# Patient Record
Sex: MALE | Race: BLACK OR AFRICAN AMERICAN
[De-identification: names, ages, dates, MRNs, and addresses within clinical notes are randomized per-mention and may not be internally consistent; named-entity substitution may affect disease eponyms.]

---

## 2021-06-15 ENCOUNTER — HOSPITAL ENCOUNTER (EMERGENCY)
Dept: HOSPITAL 97 - ER | Age: 21
Discharge: HOME | End: 2021-06-15
Payer: COMMERCIAL

## 2021-06-15 VITALS — TEMPERATURE: 98.3 F

## 2021-06-15 VITALS — SYSTOLIC BLOOD PRESSURE: 114 MMHG | DIASTOLIC BLOOD PRESSURE: 81 MMHG | OXYGEN SATURATION: 100 %
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
## 2021-06-15 DIAGNOSIS — S63.592A: ICD-10-CM

## 2021-06-15 DIAGNOSIS — S16.1XXA: Primary | ICD-10-CM

## 2021-06-15 DIAGNOSIS — S00.90XA: ICD-10-CM

## 2021-06-15 PROCEDURE — 70450 CT HEAD/BRAIN W/O DYE: CPT

## 2021-06-15 PROCEDURE — 72125 CT NECK SPINE W/O DYE: CPT

## 2021-06-15 PROCEDURE — 99283 EMERGENCY DEPT VISIT LOW MDM: CPT

## 2021-06-15 NOTE — XMS REPORT
Continuity of Care Document

                            Created on:Marleen 15, 2021



Patient:JUAN KHOURY

Sex:Male

:2000

External Reference #:[id number]





Demographics







                          Address                   [address]



                                                    [place] [address]

 

                          Home Phone                [phone]

 

                          Work Phone                [phone]

 

                          Mobile Phone              [phone]

 

                          Email Address             [email or web address]

 

                          Preferred Language        English

 

                          Marital Status            Unknown

 

                          Anglican Affiliation     Unknown

 

                          Race                      Unknown

 

                          Additional Race(s)        Unavailable



                                                    Black or 

 

                          Ethnic Group              Not  or 









Author







                          Organization              [facility]

t

 

                          Address                   [address]



                                                    [place] [address]

 

                          Phone                     [phone]









Support







                Name            Relationship    Address         Phone

 

                Chacha          Mother          Unavailable     [phone]









Care Team Providers







                    Name                Role                Phone

 

                    Lab,  Jd Pob I     Attending Clinician Unavailable









Problems

This patient has no known problems.



Allergies, Adverse Reactions, Alerts

This patient has no known allergies or adverse reactions.



Medications

This patient has no known medications.



Procedures

This patient has no known procedures.



Encounters







        Start   End     Encounter Admission Attending Care    Care    Encounter 

Source



        Date/Time Date/Time Type    Type    Clinicians Facility Department ID   

   

 

        2020 Laboratory         Lab, [facility]    [phone]

[id number] 



        12:56:43 13:16:43 Only            Fam Pob I [facility]  [phone]         



                                                [place] [id number]         



                                                Profmarlene [phone]         



                                                nal     044             



                                                Office                  



                                                Building                 



                                                One                     







Results

This patient has no known results.

## 2021-06-15 NOTE — RAD REPORT
EXAM DESCRIPTION:  RAD - Wrist Left 3 View - 6/15/2021 5:10 pm

 

CLINICAL HISTORY:   Left wrist pain status post injury

 

FINDINGS:  No  fracture or dislocation is seen.

 

If the patient continues to have symptoms to suggest an occult fracture then a followup plain film se
jose in 7 days would be recommended
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
## 2021-09-23 ENCOUNTER — HOSPITAL ENCOUNTER (EMERGENCY)
Dept: HOSPITAL 97 - ER | Age: 21
Discharge: HOME | End: 2021-09-23
Payer: SELF-PAY

## 2021-09-23 VITALS — DIASTOLIC BLOOD PRESSURE: 80 MMHG | SYSTOLIC BLOOD PRESSURE: 132 MMHG | TEMPERATURE: 98.5 F | OXYGEN SATURATION: 98 %

## 2021-09-23 DIAGNOSIS — G47.00: Primary | ICD-10-CM

## 2021-09-23 DIAGNOSIS — Z88.5: ICD-10-CM

## 2021-09-23 LAB
ALBUMIN SERPL BCP-MCNC: 4.4 G/DL (ref 3.4–5)
ALP SERPL-CCNC: 71 U/L (ref 45–117)
ALT SERPL W P-5'-P-CCNC: 41 U/L (ref 12–78)
AST SERPL W P-5'-P-CCNC: 28 U/L (ref 15–37)
BUN BLD-MCNC: 16 MG/DL (ref 7–18)
GLUCOSE SERPLBLD-MCNC: 87 MG/DL (ref 74–106)
HCT VFR BLD CALC: 43.9 % (ref 39.6–49)
INR BLD: 1.05
LYMPHOCYTES # SPEC AUTO: 2.2 K/UL (ref 0.7–4.9)
METHAMPHET UR QL SCN: NEGATIVE
PMV BLD: 9.1 FL (ref 7.6–11.3)
POTASSIUM SERPL-SCNC: 3.8 MMOL/L (ref 3.5–5.1)
RBC # BLD: 4.77 M/UL (ref 4.33–5.43)
THC SERPL-MCNC: POSITIVE NG/ML

## 2021-09-23 PROCEDURE — 85025 COMPLETE CBC W/AUTO DIFF WBC: CPT

## 2021-09-23 PROCEDURE — 80307 DRUG TEST PRSMV CHEM ANLYZR: CPT

## 2021-09-23 PROCEDURE — 80076 HEPATIC FUNCTION PANEL: CPT

## 2021-09-23 PROCEDURE — 93005 ELECTROCARDIOGRAM TRACING: CPT

## 2021-09-23 PROCEDURE — 85730 THROMBOPLASTIN TIME PARTIAL: CPT

## 2021-09-23 PROCEDURE — 80329 ANALGESICS NON-OPIOID 1 OR 2: CPT

## 2021-09-23 PROCEDURE — 36415 COLL VENOUS BLD VENIPUNCTURE: CPT

## 2021-09-23 PROCEDURE — 80320 DRUG SCREEN QUANTALCOHOLS: CPT

## 2021-09-23 PROCEDURE — 80048 BASIC METABOLIC PNL TOTAL CA: CPT

## 2021-09-23 PROCEDURE — 99284 EMERGENCY DEPT VISIT MOD MDM: CPT

## 2021-09-23 PROCEDURE — 96372 THER/PROPH/DIAG INJ SC/IM: CPT

## 2021-09-23 PROCEDURE — 85610 PROTHROMBIN TIME: CPT

## 2021-09-23 PROCEDURE — 81003 URINALYSIS AUTO W/O SCOPE: CPT

## 2021-09-23 NOTE — EDPHYS
Physician Documentation                                                                           

 [facility]                                                                 

Name: Sukhi Coates                                                                             

Age: 21 yrs                                                                                       

Sex: Male                                                                                         

: 2000                                                                                   

MRN: [id number]                                                                                   

Arrival Date: 2021                                                                          

Time: 03:10                                                                                       

Account#: [id number]                                                                            

Bed 17                                                                                            

Private MD:                                                                                       

ED Physician Quinten Harper                                                                      

HPI:                                                                                              

                                                                                             

03:55 This 21 yrs old Black Male presents to ER via [employer] with complaints of Psych  skye 

      Problem.                                                                                    

03:55 The patient presents to the emergency department with anxiety. Onset: The               skye 

      symptoms/episode began/occurred 1 day(s) ago. Past psychiatric history: Prior               

      diagnosis: bipolar disorder, Psychiatric medications include: ablify/trazadone.             

      Associated signs and symptoms: The patient has no apparent associated signs or              

      symptoms. Severity of symptoms: At their worst the symptoms were mild in the emergency      

      department the symptoms are unchanged. The patient has not experienced similar symptoms     

      in the past.                                                                                

                                                                                                  

Historical:                                                                                       

- Allergies:                                                                                      

03:17 "cane's like lidocane, numbing and sedating medications";                               ms4 

03:24 benzocaine;                                                                             ms4 

- Home Meds:                                                                                      

03:17 Abilify 2 mg oral tab 1 tab once daily [Active]; trazodone 50 mg Oral tab 1 tab once    ms4 

      daily for insomnia associated with depression [Active];                                     

- PMHx:                                                                                           

03:17 Bipolar disorder;                                                                       ms4 

                                                                                                  

- Immunization history:: Adult Immunizations up to date, Client reports receiving the             

  Luis \T\ Luis single-dose vaccine.                                                        

- Social history:: Smoking status: unknown.                                                       

- Family history:: not pertinent.                                                                 

                                                                                                  

                                                                                                  

ROS:                                                                                              

03:55 Constitutional: Negative for fever, chills, and weight loss, Eyes: Negative for injury, skye 

      pain, redness, and discharge, ENT: Negative for injury, pain, and discharge, Neck:          

      Negative for injury, pain, and swelling, Cardiovascular: Negative for chest pain,           

      palpitations, and edema, Respiratory: Negative for shortness of breath, cough,              

      wheezing, and pleuritic chest pain, Abdomen/GI: Negative for abdominal pain, nausea,        

      vomiting, diarrhea, and constipation, Back: Negative for injury and pain, : Negative      

      for injury, bleeding, discharge, and swelling, Skin: Negative for injury, rash, and         

      discoloration, Neuro: Negative for headache, weakness, numbness, tingling, and seizure,     

      Psych: Negative for depression, anxiety, suicide ideation, homicidal ideation, and          

      hallucinations, Allergy/Immunology: Negative for hives, rash, and allergies, Endocrine:     

      Negative for neck swelling, polydipsia, polyuria, polyphagia, and marked weight changes.    

03:55 MS/extremity: Positive for                                                                  

                                                                                                  

Exam:                                                                                             

03:55 Constitutional:  This is a well developed, well nourished patient who is awake, alert,  skye 

      and in no acute distress. Head/Face:  Normocephalic, atraumatic. Eyes:  Pupils equal        

      round and reactive to light, extra-ocular motions intact.  Lids and lashes normal.          

      Conjunctiva and sclera are non-icteric and not injected.  Cornea within normal limits.      

      Periorbital areas with no swelling, redness, or edema. ENT:  Nares patent. No nasal         

      discharge, no septal abnormalities noted.  Tympanic membranes are normal and external       

      auditory canals are clear.  Oropharynx with no redness, swelling, or masses, exudates,      

      or evidence of obstruction, uvula midline.  Mucous membranes moist. Neck:  Trachea          

      midline, no thyromegaly or masses palpated, and no cervical lymphadenopathy.  Supple,       

      full range of motion without nuchal rigidity, or vertebral point tenderness.  No            

      Meningismus. Chest/axilla:  Normal chest wall appearance and motion.  Nontender with no     

      deformity.  No lesions are appreciated. Cardiovascular:  Regular rate and rhythm with a     

      normal S1 and S2.  No gallops, murmurs, or rubs.  Normal PMI, no JVD.  No pulse             

      deficits. Respiratory:  Lungs have equal breath sounds bilaterally, clear to                

      auscultation and percussion.  No rales, rhonchi or wheezes noted.  No increased work of     

      breathing, no retractions or nasal flaring. Abdomen/GI:  Soft, non-tender, with normal      

      bowel sounds.  No distension or tympany.  No guarding or rebound.  No evidence of           

      tenderness throughout. Back:  No spinal tenderness.  No costovertebral tenderness.          

      Full range of motion. Male :  Normal genitalia with no discharge or lesions. Skin:        

      Warm, dry with normal turgor.  Normal color with no rashes, no lesions, and no evidence     

      of cellulitis. MS/ Extremity:  Pulses equal, no cyanosis.  Neurovascular intact.  Full,     

      normal range of motion. Neuro:  Awake and alert, GCS 15, oriented to person, place,         

      time, and situation.  Cranial nerves II-XII grossly intact.  Motor strength 5/5 in all      

      extremities.  Sensory grossly intact.  Cerebellar exam normal.  Normal gait. Psych:         

      Awake, alert, with orientation to person, place and time.  Behavior, mood, and affect       

      are within normal limits.                                                                   

04:39 ECG was reviewed by the Attending Physician.                                            [facility] 

                                                                                                  

Vital Signs:                                                                                      

03:10  / 80; Pulse 81; Resp 18; Temp 98.5(O); Pulse Ox 98% on R/A; Pain 0/10;           ms4 

03:10 Weight 68.04 kg; Height 6 ft. 2 in. (187.96 cm);                                        ms4 

03:10 Body Mass Index 19.26 (68.04 kg, 187.96 cm)                                             ms4 

                                                                                                  

MDM:                                                                                              

03:18 Patient medically screened.                                                             [facility] 

04:00 Differential diagnosis: drug withdrawal. depression, psychosis secondary to             skye 

      non-compliance. Data reviewed: vital signs, nurses notes, lab test result(s), CBC,          

      electrolytes, hepatic panel. Data interpreted: Cardiac monitor: rate is 18 beats/min,       

      rhythm is regular, Pulse oximetry: on room air. Counseling: I had a detailed discussion     

      with the patient and/or guardian regarding: the historical points, exam findings, and       

      any diagnostic results supporting the discharge/admit diagnosis, lab results, radiology     

      results.                                                                                    

07:52 ED course: Pt states he is feeling better after getting some sleep and would like to go kb  

      home. Pt is cooperative. Pt will call family member to pick him up. Pt denies homicidal     

      or suicidal ideations. .                                                                    

                                                                                                  

                                                                                             

03:27 Order name: Acetaminophen                                                               [facility] 

                                                                                             

03:27 Order name: Basic Metabolic Panel; Complete Time: 04:59                                 [facility] 

                                                                                             

03:27 Order name: CBC with Diff; Complete Time: 04:59                                         [facility] 

                                                                                             

03:27 Order name: ETOH Level; Complete Time: 04:59                                            [facility] 

                                                                                             

03:27 Order name: Hepatic Function; Complete Time: 04:59                                      [facility] 

                                                                                             

03:27 Order name: PT-INR; Complete Time: 04:59                                                [facility] 

                                                                                             

03:27 Order name: Ptt, Activated; Complete Time: 04:59                                        [facility] 

                                                                                             

03:27 Order name: Salicylate; Complete Time: 04:59                                            [facility] 

                                                                                             

03:27 Order name: Urine Drug Screen; Complete Time: 04:59                                     [facility] 

                                                                                             

03:28 Order name: Acetaminophen Level; Complete Time: 04:59                                   [facility]

                                                                                             

03:54 Order name: Urine Dipstick-Ancillary; Complete Time: 04:59                              [facility]

                                                                                             

03:27 Order name: EKG; Complete Time: 03:28                                                   [facility] 

                                                                                             

03:27 Order name: EKG - Nurse/Tech; Complete Time: 04:34                                      [facility] 

                                                                                             

03:27 Order name: IV Saline Lock; Complete Time: 03:48                                        [facility] 

                                                                                             

03:27 Order name: Labs collected and sent; Complete Time: 03:48                               [facility] 

                                                                                             

03:27 Order name: Suicide Screening ([place]); Complete Time: 03:48                          [facility] 

                                                                                             

03:27 Order name: Urine Dipstick-Ancillary (obtain specimen); Complete Time: 04:00            [facility] 

                                                                                                  

EC:39 Rate is 65 beats/min. Rhythm is regular. QRS Axis is Normal. CA interval is normal. QRS skye 

      interval is normal. QT interval is normal. No Q waves. T waves are Normal. No ST            

      changes noted. Clinical impression: NSR w/ Non-specific ST/T Changes and No evidence of     

      ischemia. Interpreted by me. Reviewed by me.                                                

                                                                                                  

Administered Medications:                                                                         

03:54 CANCELLED (Duplicate Order): NS 0.9% 500 ml IV at bolus once                            skye 

03:54 CANCELLED (Duplicate Order): Ativan (LORazepam) 1 mg IVP once                           skye 

05:08 Drug: Geodon (ziprasidone) 20 mg Route: IM; Site: right deltoid;                        jb4 

07:02 Not Given (Patient Refused): Ativan (LORazepam) 1 mg IVP once                           jb4 

                                                                                                  

                                                                                                  

Disposition:                                                                                      

21:15 Co-signature as Attending Physician, Quinten Harper MD I agree with the assessment and  [facility] 

      plan of care.                                                                               

                                                                                                  

Disposition Summary:                                                                              

21 07:59                                                                                    

Discharge Ordered                                                                                 

      Location: Home                                                                          kb  

      Problem: new(21 07:59)                                                            kb  

      Symptoms: have improved(21 07:59)                                                 kb  

      Condition: Stable(21 07:59)                                                       kb  

      Diagnosis                                                                                   

        - Bipolar disorder, unspecified(21 07:59)                                       kb  

        - Insomnia(21 07:59)                                                            kb  

      Followup:                                                                               skye 

        - With: Private Physician                                                                  

        - When: 2 - 3 days                                                                         

        - Reason: Recheck today's complaints, Continuance of care, Re-evaluation by your           

      physician                                                                                   

      Followup:                                                                               skye 

        - With:                                                                                    

        - When: 2 - 3 days                                                                         

        - Reason: Recheck today's complaints, Re-evaluation by your physician                      

      Discharge Instructions:                                                                     

        - Discharge Summary Sheet                                                             kb  

        - Managing Bipolar Disorder                                                           kb  

      Forms:                                                                                      

        - Medication Reconciliation Form                                                      kb  

        - Thank You Letter                                                                    kb  

        - Antibiotic Education                                                                kb  

        - Prescription Opioid Use                                                             kb  

Signatures:                                                                                       

Dispatcher [employer]                           EDMS                                                 

JordinJyoti, LUCITA-C                 LCUITA-Quinten Hernández MD MD cha Bryson, James, RN                       RN   jb4                                                  

Uyen Cruz RN                     RN   ms4                                                  

                                                                                                  

Corrections: (The following items were deleted from the chart)                                    

03:54 03:30 NS 0.9% 500 ml IV at bolus once ordered. skye                                      skye 

03:54 03:30 Ativan (LORazepam) 1 mg IVP once ordered. skye                                     skye 

03:55 03:30 CORONAVIRUS ordered. EDMS                                                         EDMS

04:01 03:29 to pschy skye                                                                      skye 

04:01 03:29 Psych Facility skye                                                                skye 

04:01 03:29 Higher level of care skye                                                          skye 

04:01 03:29 Stable skye                                                                        skye 

04:01 03:29 new skye                                                                           skye 

04:01 03:29 have improved skye                                                                 skye 

04:01 03:29 Insomnia skye                                                                      skye 

04:01 03:29 Bipolar disorder, unspecified skye                                                 skye 

07:59 05:02 TO PSYCH skye                                                                      kb  

07:59 05:02 Psych Facility skye                                                                kb  

07:59 05:02 Higher level of care skye                                                          kb  

07:59 05:02 Stable skye                                                                        kb  

07:59 05:02 new skye                                                                           kb  

07:59 05:02 have improved skye                                                                 kb  

07:59 05:02 Bipolar disorder, unspecified skye                                                 kb  

07:59 05:02 Insomnia skye                                                                      kb  

                                                                                                  

**************************************************************************************************
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
